# Patient Record
Sex: FEMALE | Race: WHITE | URBAN - METROPOLITAN AREA
[De-identification: names, ages, dates, MRNs, and addresses within clinical notes are randomized per-mention and may not be internally consistent; named-entity substitution may affect disease eponyms.]

---

## 2023-01-04 ENCOUNTER — OFFICE (OUTPATIENT)
Dept: URBAN - METROPOLITAN AREA CLINIC 8 | Facility: CLINIC | Age: 73
Setting detail: OPHTHALMOLOGY
End: 2023-01-04
Payer: MEDICARE

## 2023-01-04 VITALS — HEIGHT: 55 IN

## 2023-01-04 DIAGNOSIS — Z96.1: ICD-10-CM

## 2023-01-04 DIAGNOSIS — H04.123: ICD-10-CM

## 2023-01-04 PROCEDURE — 92014 COMPRE OPH EXAM EST PT 1/>: CPT | Performed by: OPHTHALMOLOGY

## 2023-01-04 ASSESSMENT — REFRACTION_AUTOREFRACTION
OD_AXIS: 118
OS_AXIS: 047
OS_CYLINDER: -0.50
OD_CYLINDER: -1.00
OD_SPHERE: +0.25
OS_SPHERE: -0.75

## 2023-01-04 ASSESSMENT — SPHEQUIV_DERIVED
OS_SPHEQUIV: -0.875
OD_SPHEQUIV: -0.25
OS_SPHEQUIV: -1

## 2023-01-04 ASSESSMENT — AXIALLENGTH_DERIVED
OS_AL: 23.2047
OS_AL: 23.2519
OD_AL: 23.2356

## 2023-01-04 ASSESSMENT — TONOMETRY
OS_IOP_MMHG: 13
OD_IOP_MMHG: 13

## 2023-01-04 ASSESSMENT — REFRACTION_CURRENTRX
OD_SPHERE: +1.50
OS_CYLINDER: SPHERE
OS_SPHERE: +1.50
OD_VPRISM_DIRECTION: SV
OS_OVR_VA: 20/
OS_VPRISM_DIRECTION: SV
OD_CYLINDER: SPHERE
OD_OVR_VA: 20/

## 2023-01-04 ASSESSMENT — REFRACTION_MANIFEST
OS_ADD: +2.25
OU_VA: 20/20
OD_AXIS: 120
OD_ADD: +2.25
OS_CYLINDER: -0.25
OS_VA2: 20/20(J1+)
OD_VA1: 20/20
OD_VA2: 20/20(J1+)
OS_VA1: 20/20
OD_SPHERE: PLANO
OS_AXIS: 045
OS_SPHERE: -0.75
OD_CYLINDER: -0.75

## 2023-01-04 ASSESSMENT — VISUAL ACUITY
OS_BCVA: 20/30
OD_BCVA: 20/40-2

## 2023-01-04 ASSESSMENT — KERATOMETRY
OD_AXISANGLE_DEGREES: 044
METHOD_AUTO_MANUAL: AUTO
OD_K1POWER_DIOPTERS: 44.50
OS_K2POWER_DIOPTERS: 45.75
OS_AXISANGLE_DEGREES: 120
OS_K1POWER_DIOPTERS: 45.25
OD_K2POWER_DIOPTERS: 45.00

## 2023-01-04 ASSESSMENT — CONFRONTATIONAL VISUAL FIELD TEST (CVF)
OS_FINDINGS: FULL
OD_FINDINGS: FULL

## 2023-05-22 PROBLEM — Z00.00 ENCOUNTER FOR PREVENTIVE HEALTH EXAMINATION: Status: ACTIVE | Noted: 2023-05-22

## 2023-05-26 ENCOUNTER — APPOINTMENT (OUTPATIENT)
Dept: CARDIOLOGY | Facility: CLINIC | Age: 73
End: 2023-05-26
Payer: MEDICARE

## 2023-05-26 VITALS
RESPIRATION RATE: 17 BRPM | SYSTOLIC BLOOD PRESSURE: 118 MMHG | HEART RATE: 87 BPM | WEIGHT: 108 LBS | HEIGHT: 65 IN | DIASTOLIC BLOOD PRESSURE: 64 MMHG | BODY MASS INDEX: 17.99 KG/M2 | OXYGEN SATURATION: 97 %

## 2023-05-26 DIAGNOSIS — Z82.49 FAMILY HISTORY OF ISCHEMIC HEART DISEASE AND OTHER DISEASES OF THE CIRCULATORY SYSTEM: ICD-10-CM

## 2023-05-26 DIAGNOSIS — Z87.39 PERSONAL HISTORY OF OTHER DISEASES OF THE MUSCULOSKELETAL SYSTEM AND CONNECTIVE TISSUE: ICD-10-CM

## 2023-05-26 PROCEDURE — 93000 ELECTROCARDIOGRAM COMPLETE: CPT

## 2023-05-26 PROCEDURE — 99204 OFFICE O/P NEW MOD 45 MIN: CPT | Mod: 25

## 2023-05-26 RX ORDER — CHROMIUM 200 MCG
TABLET ORAL
Refills: 0 | Status: ACTIVE | COMMUNITY

## 2023-05-26 RX ORDER — DENOSUMAB 60 MG/ML
60 INJECTION SUBCUTANEOUS
Refills: 0 | Status: ACTIVE | COMMUNITY

## 2023-05-26 NOTE — DISCUSSION/SUMMARY
[FreeTextEntry1] : Patient with episodes of syncope that clinically appear to be vasovagal.  To address this, adequate hydration is important which would reduce the frequency of vasovagal symptoms.  She already recognizes prodromal symptoms and lies down which prevents episodes of syncope.  Raising legs when lying down also helps further.  Event recorder for few weeks is unlikely to help in her case.\par \par Family history of early MI.  Echo and ETT should be performed.  I also requested a copy of fasting lipid profile from Dr. Franco's office.\par \par No new medications were started today.\par \par Patient should continue to remain active\par \par Thank you for this referral and allowing me to participate in the care of this patient.  If I can be of any further help or  if you have any questions, please do not hesitate to contact me\par \par \par Sincerely,\par \par Abner Omer MD, FACC, DILAN

## 2023-05-26 NOTE — HISTORY OF PRESENT ILLNESS
[FreeTextEntry1] : Karla is a 72-year-old female with no previous cardiovascular history except episodes of syncope which have been random over the years (approximately once a year) out any identifiable triggering factors.  She typically feels warm and flushed followed by lightheadedness and syncope.  She is able to abort this by lying down quickly.  She does not have any associated chest discomfort, palpitations, or shortness of breath.  She is fairly active without any exertional cardiac symptoms.\par \par Family history of MI in grandmother at young age.  Mother  early of unknown reasons\par \par Non-smoker, no history of diabetes.  No history of hypercholesterolemia.  She usually runs borderline low blood pressures.  Blood pressure in the office today is normal without postural change.

## 2023-06-06 ENCOUNTER — APPOINTMENT (OUTPATIENT)
Dept: CARDIOLOGY | Facility: CLINIC | Age: 73
End: 2023-06-06
Payer: MEDICARE

## 2023-06-06 PROCEDURE — 93306 TTE W/DOPPLER COMPLETE: CPT

## 2023-06-11 ENCOUNTER — TRANSCRIPTION ENCOUNTER (OUTPATIENT)
Age: 73
End: 2023-06-11

## 2023-08-03 ENCOUNTER — APPOINTMENT (OUTPATIENT)
Dept: CARDIOLOGY | Facility: CLINIC | Age: 73
End: 2023-08-03
Payer: MEDICARE

## 2023-08-03 PROCEDURE — 93018 CV STRESS TEST I&R ONLY: CPT

## 2023-08-10 ENCOUNTER — APPOINTMENT (OUTPATIENT)
Dept: CARDIOLOGY | Facility: CLINIC | Age: 73
End: 2023-08-10
Payer: MEDICARE

## 2023-08-10 PROCEDURE — 78452 HT MUSCLE IMAGE SPECT MULT: CPT

## 2023-08-10 PROCEDURE — A9502: CPT

## 2023-08-10 PROCEDURE — 93015 CV STRESS TEST SUPVJ I&R: CPT

## 2023-08-14 ENCOUNTER — APPOINTMENT (OUTPATIENT)
Dept: CARDIOLOGY | Facility: CLINIC | Age: 73
End: 2023-08-14

## 2023-08-21 ENCOUNTER — APPOINTMENT (OUTPATIENT)
Dept: CARDIOLOGY | Facility: CLINIC | Age: 73
End: 2023-08-21
Payer: MEDICARE

## 2023-08-21 VITALS
OXYGEN SATURATION: 98 % | DIASTOLIC BLOOD PRESSURE: 62 MMHG | HEART RATE: 81 BPM | HEIGHT: 65 IN | RESPIRATION RATE: 14 BRPM | SYSTOLIC BLOOD PRESSURE: 116 MMHG | BODY MASS INDEX: 17.49 KG/M2 | WEIGHT: 105 LBS

## 2023-08-21 DIAGNOSIS — Z86.79 PERSONAL HISTORY OF OTHER DISEASES OF THE CIRCULATORY SYSTEM: ICD-10-CM

## 2023-08-21 PROCEDURE — 99214 OFFICE O/P EST MOD 30 MIN: CPT

## 2023-08-21 NOTE — HISTORY OF PRESENT ILLNESS
[FreeTextEntry1] : Karla is a 73-year-old female with no previous cardiovascular history except episodes of syncope which have been random over the years (approximately once a year) without any identifiable triggering factors.  She typically feels warm and flushed followed by lightheadedness and syncope.  She is able to abort this by lying down quickly.  She does not have any associated chest discomfort, palpitations, or shortness of breath.  She is fairly active without any exertional cardiac symptoms.  Family history of MI in grandmother at young age.  Mother  early of unknown reasons  Non-smoker, no history of diabetes.  No history of hypercholesterolemia.  She usually runs borderline low blood pressures.  Blood pressure in the office today is normal without postural change.  She underwent further evaluation with echocardiogram and stress testing and event recorder for 3 days in 2023: Reported below

## 2023-08-21 NOTE — DISCUSSION/SUMMARY
[FreeTextEntry1] : Test results discussed with patient:  -Echocardiogram June 2023: Normal LV function and wall motion.  Normal diastolic function and PASP.  -Exercise stress test: Patient was concerned regarding fall and did not do more than 3: 45 minutes of Umair protocol exercise: Nondiagnostic test  -Lexiscan stress test with SPECT perfusion imaging showed normal perfusion  -Event recorder showed a brief SVT of 8 beats with peak heart rate of 180: Asymptomatic.  Patient with episodes of syncope that clinically appear to be vasovagal.  To address this, adequate hydration is important which would reduce the frequency of vasovagal symptoms.  She already recognizes prodromal symptoms and lies down which prevents episodes of syncope.  Raising legs when lying down also helps further.  Event recorder for few weeks is unlikely to help diagnosis  Hypercholesteremia: Patient was recently started on simvastatin at low-dose.  She will call us with the strength of simvastatin.  Her LDL was 85 in April 2023.  I reviewed follow-up in 6 to 8 months.  Earlier if needed for change in clinical status or cardiac symptoms.  Family history of early MI.  Echo and ETT should be performed.  I also requested a copy of fasting lipid profile from Dr. Franco's office.  No new medications were started today.  Patient should continue to remain active  Thank you for this referral and allowing me to participate in the care of this patient.  If I can be of any further help or  if you have any questions, please do not hesitate to contact me   Sincerely,  Abner Omer MD, FACC, DILAN

## 2023-12-27 ENCOUNTER — OFFICE (OUTPATIENT)
Dept: URBAN - METROPOLITAN AREA CLINIC 8 | Facility: CLINIC | Age: 73
Setting detail: OPHTHALMOLOGY
End: 2023-12-27
Payer: MEDICARE

## 2023-12-27 DIAGNOSIS — H04.123: ICD-10-CM

## 2023-12-27 DIAGNOSIS — Z96.1: ICD-10-CM

## 2023-12-27 PROCEDURE — 92014 COMPRE OPH EXAM EST PT 1/>: CPT | Performed by: OPHTHALMOLOGY

## 2023-12-27 ASSESSMENT — REFRACTION_MANIFEST
OS_AXIS: 050
OS_ADD: +1.50
OS_VA1: 20/20
OD_ADD: +1.50
OS_SPHERE: -0.50
OD_AXIS: 110
OD_VA2: 20/20(J1+)
OD_SPHERE: PLANO
OS_CYLINDER: -1.00
OU_VA: 20/20
OD_CYLINDER: -1.00
OD_VA1: 20/20
OS_VA2: 20/20(J1+)

## 2023-12-27 ASSESSMENT — REFRACTION_CURRENTRX
OD_OVR_VA: 20/
OD_SPHERE: +1.50
OS_CYLINDER: SPHERE
OD_CYLINDER: SPHERE
OS_OVR_VA: 20/
OS_VPRISM_DIRECTION: SV
OS_SPHERE: +1.50
OD_VPRISM_DIRECTION: SV

## 2023-12-27 ASSESSMENT — REFRACTION_AUTOREFRACTION
OD_SPHERE: -0.25
OS_SPHERE: -0.50
OS_CYLINDER: -1.00
OS_AXIS: 060
OD_AXIS: 111
OD_CYLINDER: -0.75

## 2023-12-27 ASSESSMENT — SPHEQUIV_DERIVED
OS_SPHEQUIV: -1
OD_SPHEQUIV: -0.625
OS_SPHEQUIV: -1

## 2023-12-27 ASSESSMENT — CONFRONTATIONAL VISUAL FIELD TEST (CVF)
OS_FINDINGS: FULL
OD_FINDINGS: FULL

## 2024-06-06 ENCOUNTER — NON-APPOINTMENT (OUTPATIENT)
Age: 74
End: 2024-06-06

## 2024-06-06 ENCOUNTER — APPOINTMENT (OUTPATIENT)
Dept: CARDIOLOGY | Facility: CLINIC | Age: 74
End: 2024-06-06
Payer: MEDICARE

## 2024-06-06 VITALS
BODY MASS INDEX: 17.33 KG/M2 | HEART RATE: 88 BPM | SYSTOLIC BLOOD PRESSURE: 104 MMHG | HEIGHT: 65 IN | OXYGEN SATURATION: 99 % | DIASTOLIC BLOOD PRESSURE: 58 MMHG | RESPIRATION RATE: 14 BRPM | WEIGHT: 104 LBS

## 2024-06-06 DIAGNOSIS — E78.00 PURE HYPERCHOLESTEROLEMIA, UNSPECIFIED: ICD-10-CM

## 2024-06-06 DIAGNOSIS — R55 SYNCOPE AND COLLAPSE: ICD-10-CM

## 2024-06-06 PROCEDURE — 93000 ELECTROCARDIOGRAM COMPLETE: CPT

## 2024-06-06 PROCEDURE — G2211 COMPLEX E/M VISIT ADD ON: CPT

## 2024-06-06 PROCEDURE — 99214 OFFICE O/P EST MOD 30 MIN: CPT

## 2024-06-06 RX ORDER — ROSUVASTATIN CALCIUM 10 MG/1
10 TABLET, FILM COATED ORAL
Qty: 90 | Refills: 0 | Status: DISCONTINUED | COMMUNITY
End: 2024-06-06

## 2024-06-06 NOTE — PHYSICAL EXAM
[Well Developed] : well developed [Normal S1, S2] : normal S1, S2 [No Murmur] : no murmur [Clear Lung Fields] : clear lung fields [Normal Gait] : normal gait [No Edema] : no edema [Normal Speech] : normal speech [Alert and Oriented] : alert and oriented

## 2024-06-06 NOTE — DISCUSSION/SUMMARY
[EKG obtained to assist in diagnosis and management of assessed problem(s)] : EKG obtained to assist in diagnosis and management of assessed problem(s) [FreeTextEntry1] : AISHWARYA DAWKINS is a 73 year old F who presents today Jun 06, 2024 with the above history and the following active issues:   -Syncope: Patient with episodes of syncope that clinically appear to be vasovagal.  To address this, adequate hydration is important which would reduce the frequency of vasovagal symptoms.  She already recognizes prodromal symptoms and lies down which prevents episodes of syncope.  Raising legs when lying down also helps further.  Event recorder for few weeks is unlikely to help diagnosis.   -Hypercholesteremia: LDL was 85 in April 2023 and she was started on Rosuvastatin 5mg. LDL now 112 in January 2024. Recommend increasing dose to 10mg.  Follow up lab slip provided to patient.   -Coronary Calcifications: previous testing as noted above. Negative Nuke in August 2023. Recommend CT Calcium score to further assess for coronary calcifications.   -Thyroid Nodules: Normal TSH in 1/2024. She has ongoing follow up with Dr. Franco who is monitoring thyroid.   Sincerely,   CHRISTOS Cantu-BC Patient's history, testing, and plan reviewed with supervising MD: Dr. Abner Omer

## 2024-06-06 NOTE — HISTORY OF PRESENT ILLNESS
[FreeTextEntry1] : Karla is a 73-year-old female with only history of HLD, except for episodes of syncope which have been random, approximately once a year, without any identifiable triggering factors.  She typically feels warm and flushed followed by lightheadedness and syncope. She is able to abort this by lying down quickly.   She does not have any associated chest discomfort, palpitations, or shortness of breath.    2024, she had a pre-syncopal event. Started with flushed warm feeling, and then lightheadedness. She sat herself down and put her legs up. She was able to drink some water and the episode passed within a few minutes. She did not have any associated chest pain, palps, SOB.   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 There is no exertional chest pain, pressure or discomfort. There is no significant dyspnea on exertion or orthopnea. There are no symptomatic palpitations, lightheadedness, dizziness.  In the past her blood pressures have run borderline low. Today 104/58. She is a non-smoker, no history of diabetes. Family history of MI in grandmother at young age.  Mother  young of unknown cause.  Patient had an incidental finding of thyroid nodules examined by both US and CT.  CT Chest showed 8mm R thyroid lesion and JIMENEZ lung nodule. Also coronary calcifications were noted.   Lab work from 2024 showed , Trigs 99, normal TSH and kidney function. K 4. AST/ALT 16/8.   EKG today 2024 showed Sinus Rhythm with NSST, PRWP, LAD.   Prior Testing:  -Echocardiogram 2023: Normal LV function and wall motion.  Normal diastolic function and PASP. -Exercise Stress Test 2023: Patient was concerned regarding fall and did not do more than 3: 45 minutes of Umair protocol exercise: Nondiagnostic test -Lexiscan Stress Test with SPECT perfusion imaging 2023: showed perfusion -Event recorder 2023 showed a brief SVT of 8 beats with peak heart rate of 180: Asymptomatic.

## 2024-06-21 ENCOUNTER — NON-APPOINTMENT (OUTPATIENT)
Age: 74
End: 2024-06-21

## 2024-06-21 RX ORDER — ROSUVASTATIN CALCIUM 5 MG/1
5 TABLET, FILM COATED ORAL
Qty: 90 | Refills: 1 | Status: ACTIVE | COMMUNITY
Start: 2024-06-21 | End: 1900-01-01

## 2024-07-24 ENCOUNTER — NON-APPOINTMENT (OUTPATIENT)
Age: 74
End: 2024-07-24

## 2024-07-25 ENCOUNTER — APPOINTMENT (OUTPATIENT)
Dept: CARDIOLOGY | Facility: CLINIC | Age: 74
End: 2024-07-25
Payer: MEDICARE

## 2024-07-25 VITALS
HEART RATE: 82 BPM | BODY MASS INDEX: 17.49 KG/M2 | OXYGEN SATURATION: 98 % | DIASTOLIC BLOOD PRESSURE: 60 MMHG | SYSTOLIC BLOOD PRESSURE: 112 MMHG | HEIGHT: 65 IN | RESPIRATION RATE: 14 BRPM | WEIGHT: 105 LBS

## 2024-07-25 DIAGNOSIS — E78.00 PURE HYPERCHOLESTEROLEMIA, UNSPECIFIED: ICD-10-CM

## 2024-07-25 DIAGNOSIS — E04.1 NONTOXIC SINGLE THYROID NODULE: ICD-10-CM

## 2024-07-25 DIAGNOSIS — R55 SYNCOPE AND COLLAPSE: ICD-10-CM

## 2024-07-25 PROCEDURE — 99214 OFFICE O/P EST MOD 30 MIN: CPT

## 2024-07-25 PROCEDURE — G2211 COMPLEX E/M VISIT ADD ON: CPT

## 2024-07-25 NOTE — DISCUSSION/SUMMARY
[FreeTextEntry1] : AISHWARYA DAWKINS is a 73 year old F who presents today Jun 06, 2024 with the above history and the following active issues:   -Syncope: Patient with episodes of syncope that clinically appear to be vasovagal.  To address this, adequate hydration is important which would reduce the frequency of vasovagal symptoms.  She already recognizes prodromal symptoms and lies down which prevents episodes of syncope.  Raising legs when lying down also helps further.  Event recorder for few weeks is unlikely to help diagnosis.   -Hypercholesteremia: LDL was 85 in April 2023 on 10 mg rosuvastatin which caused muscle aches.  Rosuvastatin was decreased to 5mg - LDL increased to  112 in January 2024.  She does not want to increase the dose of rosuvastatin due to significant side effect of muscle aches and fatigue.  Repeat lipid profile in July 2024 showed LDL of 108.  -Coronary Calcifications: Incidental finding on noncontrast CT. Negative Nuke in August 2023.  Dedicated CT for calcium score showed no coronary calcification (Zero )  -Thyroid Nodules: Normal TSH in 1/2024. She has ongoing follow up with Dr. Franco who is monitoring thyroid.   Thank you for this referral and allowing me to participate in the care of this patient.  If I can be of any further help or  if you have any questions, please do not hesitate to contact me   Sincerely,  Abner Omer MD, FACC, DILAN

## 2024-07-25 NOTE — HISTORY OF PRESENT ILLNESS
[FreeTextEntry1] : Karla is a 73-year-old female with history of HLD, except for episodes of syncope which have been random, approximately once a year, without any identifiable triggering factors.  She typically feels warm and flushed followed by lightheadedness and syncope. She is able to abort this by lying down quickly.   She does not have any associated chest discomfort, palpitations, or shortness of breath.    2024, she had a pre-syncopal event. Started with flushed warm feeling, and then lightheadedness. She sat herself down and put her legs up. She was able to drink some water and the episode passed within a few minutes. She did not have any associated chest pain, palps, SOB.   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 There is no exertional chest pain, pressure or discomfort. There is no significant dyspnea on exertion or orthopnea. There are no symptomatic palpitations, lightheadedness, dizziness.  In the past her blood pressures have run borderline low. Today 104/58. She is a non-smoker, no history of diabetes. Family history of MI in grandmother at young age.  Mother  young of unknown cause.  Patient had an incidental finding of thyroid nodules examined by both US and CT.  CT Chest showed 8mm R thyroid lesion and JIMENEZ lung nodule. Also coronary calcifications were noted?   Lab work from 2024 showed , Trigs 99, normal TSH and kidney function. K 4. AST/ALT 16/8.   EKG today 2024 showed Sinus Rhythm with NSST, PRWP, LAD.   Prior Testing:  -Echocardiogram 2023: Normal LV function and wall motion.  Normal diastolic function and PASP. -Exercise Stress Test 2023: Patient was concerned regarding fall and did not do more than 3: 45 minutes of Umair protocol exercise: Nondiagnostic test -Lexiscan Stress Test with SPECT perfusion imaging 2023: showed perfusion -Event recorder 2023 showed a brief SVT of 8 beats with peak heart rate of 180: Asymptomatic.

## 2025-01-10 ENCOUNTER — OFFICE (OUTPATIENT)
Dept: URBAN - METROPOLITAN AREA CLINIC 8 | Facility: CLINIC | Age: 75
Setting detail: OPHTHALMOLOGY
End: 2025-01-10
Payer: MEDICARE

## 2025-01-10 DIAGNOSIS — H43.812: ICD-10-CM

## 2025-01-10 DIAGNOSIS — H26.491: ICD-10-CM

## 2025-01-10 DIAGNOSIS — H04.123: ICD-10-CM

## 2025-01-10 PROBLEM — H43.391 VITREOUS FLOATERS; RIGHT EYE: Status: ACTIVE | Noted: 2025-01-10

## 2025-01-10 PROCEDURE — 92014 COMPRE OPH EXAM EST PT 1/>: CPT | Performed by: OPTOMETRIST

## 2025-01-10 ASSESSMENT — KERATOMETRY
OD_K1POWER_DIOPTERS: 44.50
OS_K2POWER_DIOPTERS: 45.50
OS_K1POWER_DIOPTERS: 45.00
OS_AXISANGLE_DEGREES: 136
METHOD_AUTO_MANUAL: AUTO
OD_AXISANGLE_DEGREES: 034
OD_K2POWER_DIOPTERS: 45.00

## 2025-01-10 ASSESSMENT — REFRACTION_AUTOREFRACTION
OD_CYLINDER: -1.00
OS_SPHERE: -0.25
OS_CYLINDER: -1.25
OS_AXIS: 063
OD_SPHERE: PLANO
OD_AXIS: 109

## 2025-01-10 ASSESSMENT — CONFRONTATIONAL VISUAL FIELD TEST (CVF)
OD_FINDINGS: FULL
OS_FINDINGS: FULL

## 2025-01-10 ASSESSMENT — REFRACTION_MANIFEST
OS_ADD: +1.50
OD_VA2: 20/20(J1+)
OS_CYLINDER: -1.25
OD_ADD: +1.50
OS_SPHERE: -0.25
OS_AXIS: 063
OD_VA1: 20/20
OD_AXIS: 110
OS_VA2: 20/20(J1+)
OU_VA: 20/20
OD_SPHERE: PLANO
OS_VA1: 20/20
OD_CYLINDER: -1.00

## 2025-01-10 ASSESSMENT — REFRACTION_CURRENTRX
OS_OVR_VA: 20/
OS_CYLINDER: SPHERE
OD_VPRISM_DIRECTION: SV
OS_SPHERE: +1.50
OD_OVR_VA: 20/
OS_VPRISM_DIRECTION: SV
OD_CYLINDER: SPHERE
OD_SPHERE: +1.50

## 2025-01-10 ASSESSMENT — TONOMETRY
OS_IOP_MMHG: 15
OD_IOP_MMHG: 15

## 2025-01-10 ASSESSMENT — VISUAL ACUITY
OS_BCVA: 20/40
OD_BCVA: 20/30

## 2025-02-12 ENCOUNTER — RX RENEWAL (OUTPATIENT)
Age: 75
End: 2025-02-12

## 2025-03-07 ENCOUNTER — APPOINTMENT (OUTPATIENT)
Dept: CARDIOLOGY | Facility: CLINIC | Age: 75
End: 2025-03-07
Payer: MEDICARE

## 2025-03-07 VITALS
BODY MASS INDEX: 17.33 KG/M2 | SYSTOLIC BLOOD PRESSURE: 100 MMHG | DIASTOLIC BLOOD PRESSURE: 64 MMHG | HEIGHT: 65 IN | WEIGHT: 104 LBS | RESPIRATION RATE: 14 BRPM | OXYGEN SATURATION: 97 % | HEART RATE: 76 BPM

## 2025-03-07 DIAGNOSIS — R55 SYNCOPE AND COLLAPSE: ICD-10-CM

## 2025-03-07 DIAGNOSIS — E78.00 PURE HYPERCHOLESTEROLEMIA, UNSPECIFIED: ICD-10-CM

## 2025-03-07 DIAGNOSIS — E04.1 NONTOXIC SINGLE THYROID NODULE: ICD-10-CM

## 2025-03-07 PROCEDURE — G2211 COMPLEX E/M VISIT ADD ON: CPT

## 2025-03-07 PROCEDURE — 99214 OFFICE O/P EST MOD 30 MIN: CPT
